# Patient Record
Sex: FEMALE | Race: WHITE | NOT HISPANIC OR LATINO | Employment: UNEMPLOYED | URBAN - METROPOLITAN AREA
[De-identification: names, ages, dates, MRNs, and addresses within clinical notes are randomized per-mention and may not be internally consistent; named-entity substitution may affect disease eponyms.]

---

## 2018-09-17 ENCOUNTER — NON-PROVIDER VISIT (OUTPATIENT)
Dept: URGENT CARE | Facility: PHYSICIAN GROUP | Age: 28
End: 2018-09-17

## 2018-09-17 DIAGNOSIS — Z02.1 PRE-EMPLOYMENT DRUG SCREENING: ICD-10-CM

## 2018-09-17 LAB
AMP AMPHETAMINE: NORMAL
COC COCAINE: NORMAL
INT CON NEG: NEGATIVE
INT CON POS: POSITIVE
MET METHAMPHETAMINES: NORMAL
OPI OPIATES: NORMAL
PCP PHENCYCLIDINE: NORMAL
POC DRUG COMMENT 753798-OCCUPATIONAL HEALTH: NEGATIVE
THC: NORMAL

## 2018-09-17 PROCEDURE — 80305 DRUG TEST PRSMV DIR OPT OBS: CPT | Performed by: FAMILY MEDICINE

## 2019-04-08 ENCOUNTER — OFFICE VISIT (OUTPATIENT)
Dept: URGENT CARE | Facility: CLINIC | Age: 29
End: 2019-04-08
Payer: COMMERCIAL

## 2019-04-08 ENCOUNTER — HOSPITAL ENCOUNTER (OUTPATIENT)
Facility: MEDICAL CENTER | Age: 29
End: 2019-04-08
Attending: FAMILY MEDICINE
Payer: COMMERCIAL

## 2019-04-08 VITALS
TEMPERATURE: 97.8 F | HEART RATE: 69 BPM | DIASTOLIC BLOOD PRESSURE: 78 MMHG | SYSTOLIC BLOOD PRESSURE: 122 MMHG | OXYGEN SATURATION: 99 % | RESPIRATION RATE: 14 BRPM | HEIGHT: 60 IN

## 2019-04-08 DIAGNOSIS — R11.2 NAUSEA AND VOMITING, INTRACTABILITY OF VOMITING NOT SPECIFIED, UNSPECIFIED VOMITING TYPE: ICD-10-CM

## 2019-04-08 DIAGNOSIS — Z34.90 PREGNANCY, UNSPECIFIED GESTATIONAL AGE: ICD-10-CM

## 2019-04-08 LAB
APPEARANCE UR: NORMAL
BILIRUB UR STRIP-MCNC: NEGATIVE MG/DL
COLOR UR AUTO: NORMAL
GLUCOSE UR STRIP.AUTO-MCNC: NEGATIVE MG/DL
KETONES UR STRIP.AUTO-MCNC: 80 MG/DL
LEUKOCYTE ESTERASE UR QL STRIP.AUTO: NORMAL
NITRITE UR QL STRIP.AUTO: NEGATIVE
PH UR STRIP.AUTO: 5.5 [PH] (ref 5–8)
PROT UR QL STRIP: NEGATIVE MG/DL
RBC UR QL AUTO: NEGATIVE
SP GR UR STRIP.AUTO: 1.02
UROBILINOGEN UR STRIP-MCNC: 1 MG/DL

## 2019-04-08 PROCEDURE — 99203 OFFICE O/P NEW LOW 30 MIN: CPT | Performed by: FAMILY MEDICINE

## 2019-04-08 PROCEDURE — 81002 URINALYSIS NONAUTO W/O SCOPE: CPT | Performed by: FAMILY MEDICINE

## 2019-04-08 PROCEDURE — 87086 URINE CULTURE/COLONY COUNT: CPT

## 2019-04-08 RX ORDER — SWAB
1 SWAB, NON-MEDICATED MISCELLANEOUS DAILY
Qty: 30 TAB | Refills: 3 | Status: SHIPPED | OUTPATIENT
Start: 2019-04-08

## 2019-04-08 NOTE — Clinical Note
Hi all This patient was seen 2 days ago and unfortunately urine pregnancy was not ordered.  It was done and it was positive.  Could you please result that in the chart ?  Thank You

## 2019-04-09 NOTE — PROGRESS NOTES
Subjective:      Michelle Castano is a 28 y.o. female who presents with Emesis (x 1 week can't keep anything down. thinks she might be pregant )            This is a new problem.  20-year-old female otherwise healthy presenting for intermittent episodes of emesis for the past week.  She did a pregnancy test 4 days ago which was negative.  Denies any fever chills, vaginal bleeding or spotting discharge.  She denies any urinary frequency, urgency or dysuria.  No fever or chills reported.  She denies any abdominal pain.  Currently does not feel nauseous.        ROS       Objective:     /78   Pulse 69   Temp 36.6 °C (97.8 °F)   Resp 14   Ht 1.524 m (5')   SpO2 99%      Physical Exam   Constitutional: She appears well-developed and well-nourished. No distress.   HENT:   Head: Normocephalic and atraumatic.   Right Ear: External ear normal.   Left Ear: External ear normal.   Nose: Nose normal.   Eyes: Conjunctivae are normal. No scleral icterus.   Cardiovascular: Normal rate and regular rhythm.  Exam reveals no gallop and no friction rub.    No murmur heard.  Pulmonary/Chest: Effort normal. No stridor. No respiratory distress. She has no wheezes.   Abdominal: Soft. There is no tenderness. There is no CVA tenderness.   Neurological: She is alert.   Skin: Skin is warm. No rash noted. She is not diaphoretic. No erythema. No pallor.   Psychiatric: She has a normal mood and affect.           Results for orders placed or performed in visit on 04/08/19   POCT Urinalysis   Result Value Ref Range    POC Color dark yellow Negative    POC Appearance cloudy Negative    POC Leukocyte Esterase small Negative    POC Nitrites negative Negative    POC Urobiligen 1.0 Negative (0.2) mg/dL    POC Protein negative Negative mg/dL    POC Urine PH 5.5 5.0 - 8.0    POC Blood negative Negative    POC Specific Gravity 1.020 <1.005 - >1.030    POC Ketones 80 Negative mg/dL    POC Bilirubin negative Negative mg/dL    POC Glucose negative  Negative mg/dL     Pregnancy test was positive        Assessment/Plan:     1. Pregnancy, unspecified gestational age  - REFERRAL TO OB/GYN  - prenatal vitamin with Fe/FA (NORRIS PRENATAL) 28-0.8 MG Tab; Take 1 Tab by mouth every day.  Dispense: 30 Tab; Refill: 3    2. Nausea and vomiting, intractability of vomiting not specified, unspecified vomiting type  - URINE CULTURE(NEW); Future  - POCT Urinalysis    Late entry today  Urine pregnancy was positive which was given to me on a piece of paper but unfortunately was not ordered to be documented in the chart which I just ordered.  Patient was sent home with prenatal vitamin and referral to GYN  Urine culture pending just to make sure she does not have any urinary tract infection and she does not have any urinary symptoms.  Plan per orders and instructions  Warning signs reviewed

## 2019-04-11 LAB
BACTERIA UR CULT: NORMAL
SIGNIFICANT IND 70042: NORMAL
SITE SITE: NORMAL
SOURCE SOURCE: NORMAL

## 2019-05-20 LAB
ABO GROUP BLD: NORMAL
HBV SURFACE AG SERPL QL IA: NEGATIVE
HIV 1+2 AB+HIV1 P24 AG SERPL QL IA: NORMAL
RH BLD: NORMAL
RUBV IGG SERPL IA-ACNC: NORMAL
TREPONEMA PALLIDUM IGG+IGM AB [PRESENCE] IN SERUM OR PLASMA BY IMMUNOASSAY: NORMAL

## 2019-10-31 LAB — STREP GP B DNA PCR: NEGATIVE

## 2019-11-29 ENCOUNTER — HOSPITAL ENCOUNTER (INPATIENT)
Facility: MEDICAL CENTER | Age: 29
LOS: 1 days | End: 2019-11-30
Attending: OBSTETRICS & GYNECOLOGY | Admitting: OBSTETRICS & GYNECOLOGY
Payer: COMMERCIAL

## 2019-11-29 ENCOUNTER — ANESTHESIA (OUTPATIENT)
Dept: ANESTHESIOLOGY | Facility: MEDICAL CENTER | Age: 29
End: 2019-11-29
Payer: COMMERCIAL

## 2019-11-29 ENCOUNTER — ANESTHESIA EVENT (OUTPATIENT)
Dept: ANESTHESIOLOGY | Facility: MEDICAL CENTER | Age: 29
End: 2019-11-29
Payer: COMMERCIAL

## 2019-11-29 LAB
BASOPHILS # BLD AUTO: 0.4 % (ref 0–1.8)
BASOPHILS # BLD: 0.07 K/UL (ref 0–0.12)
EOSINOPHIL # BLD AUTO: 0.02 K/UL (ref 0–0.51)
EOSINOPHIL NFR BLD: 0.1 % (ref 0–6.9)
ERYTHROCYTE [DISTWIDTH] IN BLOOD BY AUTOMATED COUNT: 47 FL (ref 35.9–50)
ERYTHROCYTE [DISTWIDTH] IN BLOOD BY AUTOMATED COUNT: 47.3 FL (ref 35.9–50)
HCT VFR BLD AUTO: 31.1 % (ref 37–47)
HCT VFR BLD AUTO: 39.2 % (ref 37–47)
HGB BLD-MCNC: 10.2 G/DL (ref 12–16)
HGB BLD-MCNC: 12.7 G/DL (ref 12–16)
HOLDING TUBE BB 8507: NORMAL
IMM GRANULOCYTES # BLD AUTO: 0.15 K/UL (ref 0–0.11)
IMM GRANULOCYTES NFR BLD AUTO: 0.9 % (ref 0–0.9)
LYMPHOCYTES # BLD AUTO: 1.73 K/UL (ref 1–4.8)
LYMPHOCYTES NFR BLD: 10.8 % (ref 22–41)
MCH RBC QN AUTO: 28.9 PG (ref 27–33)
MCH RBC QN AUTO: 29.1 PG (ref 27–33)
MCHC RBC AUTO-ENTMCNC: 32.4 G/DL (ref 33.6–35)
MCHC RBC AUTO-ENTMCNC: 32.8 G/DL (ref 33.6–35)
MCV RBC AUTO: 88.9 FL (ref 81.4–97.8)
MCV RBC AUTO: 89.3 FL (ref 81.4–97.8)
MONOCYTES # BLD AUTO: 1.12 K/UL (ref 0–0.85)
MONOCYTES NFR BLD AUTO: 7 % (ref 0–13.4)
NEUTROPHILS # BLD AUTO: 12.9 K/UL (ref 2–7.15)
NEUTROPHILS NFR BLD: 80.8 % (ref 44–72)
NRBC # BLD AUTO: 0 K/UL
NRBC BLD-RTO: 0 /100 WBC
PLATELET # BLD AUTO: 281 K/UL (ref 164–446)
PLATELET # BLD AUTO: 312 K/UL (ref 164–446)
PMV BLD AUTO: 10.2 FL (ref 9–12.9)
PMV BLD AUTO: 10.3 FL (ref 9–12.9)
RBC # BLD AUTO: 3.5 M/UL (ref 4.2–5.4)
RBC # BLD AUTO: 4.39 M/UL (ref 4.2–5.4)
WBC # BLD AUTO: 16 K/UL (ref 4.8–10.8)
WBC # BLD AUTO: 17.4 K/UL (ref 4.8–10.8)

## 2019-11-29 PROCEDURE — 770002 HCHG ROOM/CARE - OB PRIVATE (112)

## 2019-11-29 PROCEDURE — 59409 OBSTETRICAL CARE: CPT

## 2019-11-29 PROCEDURE — 85025 COMPLETE CBC W/AUTO DIFF WBC: CPT

## 2019-11-29 PROCEDURE — 700101 HCHG RX REV CODE 250

## 2019-11-29 PROCEDURE — 700102 HCHG RX REV CODE 250 W/ 637 OVERRIDE(OP): Performed by: OBSTETRICS & GYNECOLOGY

## 2019-11-29 PROCEDURE — 700111 HCHG RX REV CODE 636 W/ 250 OVERRIDE (IP)

## 2019-11-29 PROCEDURE — 700111 HCHG RX REV CODE 636 W/ 250 OVERRIDE (IP): Performed by: OBSTETRICS & GYNECOLOGY

## 2019-11-29 PROCEDURE — 3E0H7GC INTRODUCTION OF OTHER THERAPEUTIC SUBSTANCE INTO LOWER GI, VIA NATURAL OR ARTIFICIAL OPENING: ICD-10-PCS | Performed by: OBSTETRICS & GYNECOLOGY

## 2019-11-29 PROCEDURE — 85027 COMPLETE CBC AUTOMATED: CPT

## 2019-11-29 PROCEDURE — A9270 NON-COVERED ITEM OR SERVICE: HCPCS | Performed by: OBSTETRICS & GYNECOLOGY

## 2019-11-29 PROCEDURE — 36415 COLL VENOUS BLD VENIPUNCTURE: CPT

## 2019-11-29 PROCEDURE — 304965 HCHG RECOVERY SERVICES

## 2019-11-29 PROCEDURE — 700105 HCHG RX REV CODE 258: Performed by: OBSTETRICS & GYNECOLOGY

## 2019-11-29 RX ORDER — ROPIVACAINE HYDROCHLORIDE 2 MG/ML
INJECTION, SOLUTION EPIDURAL; INFILTRATION; PERINEURAL CONTINUOUS
Status: DISCONTINUED | OUTPATIENT
Start: 2019-11-29 | End: 2019-11-29

## 2019-11-29 RX ORDER — LIDOCAINE HYDROCHLORIDE 10 MG/ML
INJECTION, SOLUTION INFILTRATION; PERINEURAL
Status: COMPLETED
Start: 2019-11-29 | End: 2019-11-29

## 2019-11-29 RX ORDER — SODIUM CHLORIDE, SODIUM LACTATE, POTASSIUM CHLORIDE, AND CALCIUM CHLORIDE .6; .31; .03; .02 G/100ML; G/100ML; G/100ML; G/100ML
250 INJECTION, SOLUTION INTRAVENOUS PRN
Status: DISCONTINUED | OUTPATIENT
Start: 2019-11-29 | End: 2019-11-29 | Stop reason: HOSPADM

## 2019-11-29 RX ORDER — DOCUSATE SODIUM 100 MG/1
100 CAPSULE, LIQUID FILLED ORAL 2 TIMES DAILY PRN
Status: DISCONTINUED | OUTPATIENT
Start: 2019-11-29 | End: 2019-11-30 | Stop reason: HOSPADM

## 2019-11-29 RX ORDER — SODIUM CHLORIDE, SODIUM LACTATE, POTASSIUM CHLORIDE, AND CALCIUM CHLORIDE .6; .31; .03; .02 G/100ML; G/100ML; G/100ML; G/100ML
1000 INJECTION, SOLUTION INTRAVENOUS
Status: DISCONTINUED | OUTPATIENT
Start: 2019-11-29 | End: 2019-11-29 | Stop reason: HOSPADM

## 2019-11-29 RX ORDER — SODIUM CHLORIDE, SODIUM LACTATE, POTASSIUM CHLORIDE, CALCIUM CHLORIDE 600; 310; 30; 20 MG/100ML; MG/100ML; MG/100ML; MG/100ML
INJECTION, SOLUTION INTRAVENOUS
Status: ACTIVE
Start: 2019-11-29 | End: 2019-11-29

## 2019-11-29 RX ORDER — CITRIC ACID/SODIUM CITRATE 334-500MG
30 SOLUTION, ORAL ORAL EVERY 6 HOURS PRN
Status: DISCONTINUED | OUTPATIENT
Start: 2019-11-29 | End: 2019-11-29 | Stop reason: HOSPADM

## 2019-11-29 RX ORDER — ONDANSETRON 4 MG/1
4 TABLET, ORALLY DISINTEGRATING ORAL EVERY 6 HOURS PRN
Status: DISCONTINUED | OUTPATIENT
Start: 2019-11-29 | End: 2019-11-30 | Stop reason: HOSPADM

## 2019-11-29 RX ORDER — ALUMINA, MAGNESIA, AND SIMETHICONE 2400; 2400; 240 MG/30ML; MG/30ML; MG/30ML
30 SUSPENSION ORAL EVERY 6 HOURS PRN
Status: DISCONTINUED | OUTPATIENT
Start: 2019-11-29 | End: 2019-11-29 | Stop reason: HOSPADM

## 2019-11-29 RX ORDER — ACETAMINOPHEN 325 MG/1
325 TABLET ORAL EVERY 4 HOURS PRN
Status: DISCONTINUED | OUTPATIENT
Start: 2019-11-29 | End: 2019-11-30 | Stop reason: HOSPADM

## 2019-11-29 RX ORDER — SODIUM CHLORIDE, SODIUM LACTATE, POTASSIUM CHLORIDE, CALCIUM CHLORIDE 600; 310; 30; 20 MG/100ML; MG/100ML; MG/100ML; MG/100ML
INJECTION, SOLUTION INTRAVENOUS PRN
Status: DISCONTINUED | OUTPATIENT
Start: 2019-11-29 | End: 2019-11-30 | Stop reason: HOSPADM

## 2019-11-29 RX ORDER — MISOPROSTOL 200 UG/1
800 TABLET ORAL
Status: COMPLETED | OUTPATIENT
Start: 2019-11-29 | End: 2019-11-29

## 2019-11-29 RX ORDER — SODIUM CHLORIDE, SODIUM LACTATE, POTASSIUM CHLORIDE, CALCIUM CHLORIDE 600; 310; 30; 20 MG/100ML; MG/100ML; MG/100ML; MG/100ML
INJECTION, SOLUTION INTRAVENOUS CONTINUOUS
Status: DISCONTINUED | OUTPATIENT
Start: 2019-11-29 | End: 2019-11-29

## 2019-11-29 RX ORDER — IBUPROFEN 600 MG/1
600 TABLET ORAL EVERY 6 HOURS PRN
Status: DISCONTINUED | OUTPATIENT
Start: 2019-11-29 | End: 2019-11-30 | Stop reason: HOSPADM

## 2019-11-29 RX ORDER — OXYCODONE HYDROCHLORIDE 5 MG/1
5 TABLET ORAL EVERY 4 HOURS PRN
Status: DISCONTINUED | OUTPATIENT
Start: 2019-11-29 | End: 2019-11-30 | Stop reason: HOSPADM

## 2019-11-29 RX ORDER — ONDANSETRON 2 MG/ML
4 INJECTION INTRAMUSCULAR; INTRAVENOUS EVERY 6 HOURS PRN
Status: DISCONTINUED | OUTPATIENT
Start: 2019-11-29 | End: 2019-11-30 | Stop reason: HOSPADM

## 2019-11-29 RX ORDER — MISOPROSTOL 200 UG/1
600 TABLET ORAL
Status: DISCONTINUED | OUTPATIENT
Start: 2019-11-29 | End: 2019-11-30 | Stop reason: HOSPADM

## 2019-11-29 RX ORDER — TERBUTALINE SULFATE 1 MG/ML
0.25 INJECTION, SOLUTION SUBCUTANEOUS PRN
Status: DISCONTINUED | OUTPATIENT
Start: 2019-11-29 | End: 2019-11-29 | Stop reason: HOSPADM

## 2019-11-29 RX ADMIN — MISOPROSTOL 800 MCG: 200 TABLET ORAL at 12:41

## 2019-11-29 RX ADMIN — Medication 125 ML/HR: at 21:37

## 2019-11-29 RX ADMIN — LIDOCAINE HYDROCHLORIDE: 10 INJECTION, SOLUTION INFILTRATION; PERINEURAL at 11:45

## 2019-11-29 RX ADMIN — IBUPROFEN 600 MG: 600 TABLET ORAL at 18:59

## 2019-11-29 RX ADMIN — Medication 125 ML/HR: at 12:37

## 2019-11-29 RX ADMIN — IBUPROFEN 600 MG: 600 TABLET ORAL at 12:37

## 2019-11-29 RX ADMIN — Medication 2000 ML/HR: at 11:47

## 2019-11-29 RX ADMIN — LIDOCAINE HYDROCHLORIDE: 10 INJECTION, SOLUTION INFILTRATION; PERINEURAL at 11:00

## 2019-11-29 RX ADMIN — SODIUM CHLORIDE, POTASSIUM CHLORIDE, SODIUM LACTATE AND CALCIUM CHLORIDE: 600; 310; 30; 20 INJECTION, SOLUTION INTRAVENOUS at 11:15

## 2019-11-29 SDOH — ECONOMIC STABILITY: FOOD INSECURITY: WITHIN THE PAST 12 MONTHS, YOU WORRIED THAT YOUR FOOD WOULD RUN OUT BEFORE YOU GOT MONEY TO BUY MORE.: NEVER TRUE

## 2019-11-29 SDOH — ECONOMIC STABILITY: FOOD INSECURITY: WITHIN THE PAST 12 MONTHS, THE FOOD YOU BOUGHT JUST DIDN'T LAST AND YOU DIDN'T HAVE MONEY TO GET MORE.: NEVER TRUE

## 2019-11-29 SDOH — ECONOMIC STABILITY: TRANSPORTATION INSECURITY
IN THE PAST 12 MONTHS, HAS LACK OF TRANSPORTATION KEPT YOU FROM MEETINGS, WORK, OR FROM GETTING THINGS NEEDED FOR DAILY LIVING?: NO

## 2019-11-29 SDOH — ECONOMIC STABILITY: TRANSPORTATION INSECURITY
IN THE PAST 12 MONTHS, HAS THE LACK OF TRANSPORTATION KEPT YOU FROM MEDICAL APPOINTMENTS OR FROM GETTING MEDICATIONS?: NO

## 2019-11-29 ASSESSMENT — PATIENT HEALTH QUESTIONNAIRE - PHQ9
2. FEELING DOWN, DEPRESSED, IRRITABLE, OR HOPELESS: NOT AT ALL
SUM OF ALL RESPONSES TO PHQ9 QUESTIONS 1 AND 2: 0
1. LITTLE INTEREST OR PLEASURE IN DOING THINGS: NOT AT ALL

## 2019-11-29 ASSESSMENT — EDINBURGH POSTNATAL DEPRESSION SCALE (EPDS)
I HAVE LOOKED FORWARD WITH ENJOYMENT TO THINGS: AS MUCH AS I EVER DID
I HAVE BEEN ABLE TO LAUGH AND SEE THE FUNNY SIDE OF THINGS: AS MUCH AS I ALWAYS COULD
THINGS HAVE BEEN GETTING ON TOP OF ME: NO, MOST OF THE TIME I HAVE COPED QUITE WELL
I HAVE BLAMED MYSELF UNNECESSARILY WHEN THINGS WENT WRONG: NOT VERY OFTEN
I HAVE FELT SCARED OR PANICKY FOR NO GOOD REASON: NO, NOT AT ALL
THE THOUGHT OF HARMING MYSELF HAS OCCURRED TO ME: NEVER
I HAVE BEEN ANXIOUS OR WORRIED FOR NO GOOD REASON: NO, NOT AT ALL
I HAVE BEEN SO UNHAPPY THAT I HAVE HAD DIFFICULTY SLEEPING: NOT AT ALL
I HAVE BEEN SO UNHAPPY THAT I HAVE BEEN CRYING: NO, NEVER
I HAVE FELT SAD OR MISERABLE: NO, NOT AT ALL

## 2019-11-29 ASSESSMENT — LIFESTYLE VARIABLES: EVER_SMOKED: YES

## 2019-11-29 NOTE — H&P
Obstetrics History and Physical    Date: 19    CATALINA: 19    Patient ID: Michelle Castano is a 29 y.o.  at 40+5 by 12+3 U/S, EDC 2019    Chief Complaint:     History of Presenting Illness:   Patient reports SROM this a.m. at 0100. Also with contractions that are increasing in frequency and severity. Endorses + FM. Denies vaginal bleeding. Denies H/A, changes in vision, SOB/CP, fevers/chills, N/V, abdominal pain, dysuria, or hematuria. Sees Dr. Uribe for OB care. No complications in this pregnancy.     Review of systems: Pertinent items are noted in HPI.    Past Medical History:    No past medical history on file.    Past Surgical History:   No past surgical history on file.     Allergies:   No Known Allergies    Current Medications:  No current facility-administered medications on file prior to encounter.      Current Outpatient Medications on File Prior to Encounter   Medication Sig Dispense Refill   • prenatal vitamin with Fe/FA (NORRIS PRENATAL) 28-0.8 MG Tab Take 1 Tab by mouth every day. 30 Tab 3     Family History:   Denies FMHx of bleeding or clotting disorders  Denies FMHx of cardiovascular disease  Otherwise non-contributory    Social History:  Social History     Tobacco Use   • Smoking status: Current Every Day Smoker   • Smokeless tobacco: Never Used   Substance Use Topics   • Alcohol use: No   • Drug use: Yes     Types: Marijuana     Obstetric History:  G1:  x 1 () 8#7oz. no complications  G2: current  [facility] Care: [facility], Dr Sabiha Uribe    Gynecologic History:  LMP 2019  Menstrual cycles: regular   STDs: denies  Herpes: denies    Physical Exam:  Vitals:   Vitals:    19 1012   Temp: 36.6 °C (97.9 °F)   BP 96/50, HR 76, afebrile  General:   Alert, conversational, pleasant, no acute distress  Lungs:   No respiratory distress, lungs clear to auscultation bilaterally   Heart:   Regular rate and rhythm   Abdomen:  Soft, gravid, non-tender,  non-distended   FUNDAL HEIGHT: 41cm  Genitourinary:    SVE: 6/100/-1, grossly ruptured per RN  FETUS: EFW: 8.5 lbs by palpation     Fetal Heart Tracing:  Fetal heart variability: moderate  Fetal Heart Rate decelerations: variable  Fetal Heart Rate accelerations: yes  Baseline FHR: 130 beats per minute  Uterine contractions: regular, every 3-4 minutes    OB Ultrasound:   - Fetus: bridges  - Fetal position: cephalic per chart review  - Placenta: posterior   - Previa: no  - ELIER: n/a  - Other: anatomy normal per chart review    Labs:  Prenatal Labs reviewed:  O + / Ab neg  Rubella Immune  Hep B S Ag neg, RPR NR, Rubella Immune, HIV NR  1ht   GBS negative  Hgb 12.8, Plts 261  Declined genetic and carrier screening    Assessment:  Michelle Castano is a 29 y.o.  at 40+5 by 12+3 wk U/S, EDC 2019    1. Single Live intrauterine pregnancy at 40+5weeks  2. Active labor  3. Rupture of membranes    Plan:  - Admit to L&D for expectant management of active labor  - CLD  - IVFs  - CBC, Type and screen  - Epidural per anesthesia    Heather Mcgraw M.D.

## 2019-11-29 NOTE — L&D DELIVERY NOTE
Date: 19    PREOPERATIVE DIAGNOSIS:   1. Term intrauterine pregnancy at 40+5 weeks  2. Active labor  3. Spontaneous rupture of membranes    POSTOPERATIVE DIAGNOSIS: same as above  4. Postpartum hemorrhage  5. Uterine atony    PROCEDURE: Normal spontaneous vaginal delivery    Primary OB/GYN: Sakina Richey M.D.     Delivering Physician: Heather Mcgraw M.D.     Anesthesiologist: n/a    Anesthesia: n/a    Complications: None    Estimated blood loss: 200 ml + 675 ml post delivery  = 875 ml total      PROCEDURE DETAILS:   29 y.o.  ,now  delivered vaginally under epidural anesthesia. The infant was suctioned with the bulb. There was no nuchal cord. The infant was placed on the patient's abdomen after delivery. The cord was clamped after a 30 second delay and subsequently cut by the father of the baby. The fundus was firm with massage and IV pitocin. The placenta delivered spontaneously, intact, with normal 3-vessel cord, in valderrama presentation.     There were no cervical, vaginal, or perineal lacerations. Viable female infant weighs 3,120g (6lbs, 14oz.) Apgars were 8 and 9 at 1 and 5 minutes of life. Cord gases: Arterial pH: 7.3, BE: -9.0 Venous pH: 7.48, BE: -6.0.     Mother and infant are recovering and doing well at this time.     RN called to inform me patient had 400ml of bleeding after delivery. RN gave 1,000 mcg of rectal cytotec. I saw and  evaluated the patient and preformed a sterile bimanual exam. 275 ml of clot extracted. Uterine atony was appreciated but clamped down with removal of clots. Continue Pitocin 125m/hrl for 24 hours postpartum. Will continue to monitor.

## 2019-11-29 NOTE — CARE PLAN
Problem: Potential for postpartum infection related to presence of episiotomy/vaginal tear and/or uterine contamination  Goal: Patient will be absent from signs and symptoms of infection  Outcome: PROGRESSING AS EXPECTED  Note:   Patient shows no s/s of infection.  Will continue to monitor with hourly rounding.      Problem: Alteration in comfort related to episiotomy, vaginal repair and/or after birth pains  Goal: Patient verbalizes acceptable pain level  Outcome: PROGRESSING AS EXPECTED  Note:   Patient states pain is tolerable with pain medication.  Will continue to reassess and medicate accordingly to 0-10 pain scale.

## 2019-11-29 NOTE — PROGRESS NOTES
Patient comes in with complaints of leaking fluid starting at 0300, clear fluid and contractions starting at 0830.  Sterile speculum done, gross pooling seen.  SVE is 6/100/-1.  Dr Mcgraw called.  Patient to be admitted.  Admission done.  Patient desires epidural. Patient feeling urge to push prior to epidural.  SVE complete and patient feeling urge to push.  Dr Mcgraw called and room ready for delivery.   at 1137.      Recovery: Firm/ mod with clots/at U.  Dr Mcgraw called, ebl total after including postpartum bleeding is 980.  Cytotec given.  Bleeding improved. Per Dr Mcgraw post partum pitocin to continue to run at 125ml/hr for 24 hours.  Patient up to bathroom, voided.  Pericare done, education given.     Patient transferred to post partum.  Report given to Laverne VAZQUEZ.

## 2019-11-30 VITALS
HEIGHT: 60 IN | WEIGHT: 190 LBS | RESPIRATION RATE: 18 BRPM | DIASTOLIC BLOOD PRESSURE: 71 MMHG | BODY MASS INDEX: 37.3 KG/M2 | HEART RATE: 80 BPM | TEMPERATURE: 97.9 F | SYSTOLIC BLOOD PRESSURE: 112 MMHG | OXYGEN SATURATION: 93 %

## 2019-11-30 PROCEDURE — A9270 NON-COVERED ITEM OR SERVICE: HCPCS | Performed by: OBSTETRICS & GYNECOLOGY

## 2019-11-30 PROCEDURE — 700102 HCHG RX REV CODE 250 W/ 637 OVERRIDE(OP): Performed by: OBSTETRICS & GYNECOLOGY

## 2019-11-30 PROCEDURE — 700111 HCHG RX REV CODE 636 W/ 250 OVERRIDE (IP): Performed by: OBSTETRICS & GYNECOLOGY

## 2019-11-30 PROCEDURE — 700112 HCHG RX REV CODE 229: Performed by: OBSTETRICS & GYNECOLOGY

## 2019-11-30 RX ORDER — IBUPROFEN 600 MG/1
600 TABLET ORAL EVERY 6 HOURS PRN
Qty: 28 TAB | Refills: 0 | Status: SHIPPED | OUTPATIENT
Start: 2019-11-30 | End: 2019-12-07

## 2019-11-30 RX ADMIN — Medication 125 ML/HR: at 05:46

## 2019-11-30 RX ADMIN — DOCUSATE SODIUM 100 MG: 100 CAPSULE, LIQUID FILLED ORAL at 17:20

## 2019-11-30 RX ADMIN — ACETAMINOPHEN 325 MG: 325 TABLET, FILM COATED ORAL at 17:20

## 2019-11-30 RX ADMIN — IBUPROFEN 600 MG: 600 TABLET ORAL at 03:00

## 2019-11-30 RX ADMIN — IBUPROFEN 600 MG: 600 TABLET ORAL at 17:20

## 2019-11-30 NOTE — CARE PLAN
Problem: Communication  Goal: The ability to communicate needs accurately and effectively will improve  Outcome: MET     Problem: Safety  Goal: Will remain free from injury  Outcome: MET  Goal: Will remain free from falls  Outcome: MET     Problem: Infection  Goal: Will remain free from infection  Outcome: MET     Problem: Venous Thromboembolism (VTW)/Deep Vein Thrombosis (DVT) Prevention:  Goal: Patient will participate in Venous Thrombosis (VTE)/Deep Vein Thrombosis (DVT)Prevention Measures  Outcome: MET     Problem: Bowel/Gastric:  Goal: Normal bowel function is maintained or improved  Outcome: MET  Goal: Will not experience complications related to bowel motility  Outcome: MET     Problem: Knowledge Deficit  Goal: Knowledge of disease process/condition, treatment plan, diagnostic tests, and medications will improve  Outcome: MET  Goal: Knowledge of the prescribed therapeutic regimen will improve  Outcome: MET     Problem: Discharge Barriers/Planning  Goal: Patient's continuum of care needs will be met  Outcome: MET     Problem: Fluid Volume:  Goal: Will maintain balanced intake and output  Outcome: MET     Problem: Altered physiologic condition related to immediate post-delivery state and potential for bleeding/hemorrhage  Goal: Patient physiologically stable as evidenced by normal lochia, palpable uterine involution and vital signs within normal limits  Outcome: MET     Problem: Potential for postpartum infection related to presence of episiotomy/vaginal tear and/or uterine contamination  Goal: Patient will be absent from signs and symptoms of infection  Outcome: MET     Problem: Alteration in comfort related to episiotomy, vaginal repair and/or after birth pains  Goal: Patient is able to ambulate, care for self and infant  Outcome: MET  Goal: Patient verbalizes acceptable pain level  Outcome: MET     Problem: Potential knowledge deficit related to lack of understanding of self and  care  Goal: Patient  will verbalize understanding of self and infant care  Outcome: MET  Goal: Patient will demonstrate ability to care for self and infant  Outcome: MET     Problem: Potential anxiety related to difficulty adapting to parental role  Goal: Patient will verbalize and demonstrate effective bonding and parenting behavior  Outcome: MET     Problem: Pain Management  Goal: Pain level will decrease to patient's comfort goal  Outcome: MET

## 2019-11-30 NOTE — CONSULTS
Lactation note:  Initial visit. Mother currently has infant latched to right breast in cradle hold. She states she's been able to latch infant independently since delivery, and states she feels no pain or pinching with latch. She  her first child for 18 months, and did not have any issues or problems. Reviewed normal  feeding behaviors and normal course of breastfeeding at 12-24 hours, and what to expect. Discussed importance of offering breast with feeding cues or at least every 2-3 hours, and even if infant shows no interest, can do hand expression into infant's lips. Encouraged to continue doing skin to skin. Plan for tonight is to continue to offer breast first, if not latching well, can hand express colostrum, and refeed to infant.    Encouraged her to continue to work on deep latch, and skin 2 skin, with hand expression.     Information and phone numbers to the [facility] provided & invited to breastfeeding circles.    MOB has no other questions or concerns regarding breastfeeding. Encouraged to call for assistance as needed.

## 2019-11-30 NOTE — DISCHARGE INSTRUCTIONS
POSTPARTUM DISCHARGE INSTRUCTIONS FOR MOM    YOB: 1990   Age: 29 y.o.               Admit Date: 11/29/2019     Discharge Date: 11/30/2019  Attending Doctor:  Sabiha Uribe M.D.                  Allergies:  Patient has no known allergies.    Discharged to home by car. Discharged via wheelchair, hospital escort: Yes.  Special equipment needed: Not Applicable  Belongings with: Personal  Be sure to schedule a follow-up appointment with your primary care doctor or any specialists as instructed.     Discharge Plan:   Diet Plan: Discussed  Activity Level: Discussed  Confirmed Follow up Appointment: Appointment Scheduled  Confirmed Symptoms Management: Discussed  Medication Reconciliation Updated: No (Comments)  Influenza Vaccine Indication: Not indicated: Previously immunized this influenza season and > 8 years of age    REASONS TO CALL YOUR OBSTETRICIAN:  1.   Persistent fever or shaking chills (Temperature higher than 100.4)  2.   Heavy bleeding (soaking more than 1 pad per hour); Passing clots  3.   Foul odor from vagina  4.   Mastitis (Breast infection; breast pain, chills, fever, redness)  5.   Urinary pain, burning or frequency  6.   Severe depression longer than 24 hours    HAND WASHING  · Prior to handling the baby.  · Before breastfeeding or bottle feeding baby.  · After using the bathroom or changing the baby's diaper.    VAGINAL CARE  · Nothing inside vagina for 6 weeks: no sexual intercourse, tampons or douching.  · Bleeding may continue for 2-4 weeks.  Amount may vary.    · Call your physician for heavy bleeding which means soaking more than 1 pad per hour    BIRTH CONTROL  · It is possible to become pregnant at any time after delivery and while breastfeeding.  · Plan to discuss a method of birth control with your physician at your follow up visit. visit.    DIET AND ELIMINATION  · Eating more fiber (bran cereal, fruits, and vegetables) and drinking plenty of fluids will help to avoid  "constipation.  · Urinary frequency after childbirth is normal.    POSTPARTUM BLUES  During the first few days after birth, you may experience a sense of the \"blues\" which may include impatience, irritability or even crying.  These feeling come and go quickly.  However, as many as 1 in 10 women experience emotional symptoms known as postpartum depression.    Postpartum depression:  May start as early as the second or third day after delivery or take several weeks or months to develop.  Symptoms of \"blues\" are present, but are more intense:  Crying spells; loss of appetite; feelings of hopelessness or loss of control; fear of touching the baby; over concern or no concern at all about the baby; little or no concern about your own appearance/caring for yourself; and/or inability to sleep or excessive sleeping.  Contact your physician if you are experiencing any of these symptoms.    Crisis Hotline:  · [place] Crisis Hotline:  4-973-CQJQHNF  Or 1-296.696.1931  · [place] Crisis Hotline:  1-281.743.6349  Or 209-246-4358    PREVENTING SHAKEN BABY:  If you are angry or stressed, PUT THE BABY IN THE CRIB, step away, take some deep breaths, and wait until you are calm to care for the baby.  DO NOT SHAKE THE BABY.  You are not alone, call a supporter for help.    · Crisis Call Center 24/7 crisis line 390-770-1581 or 1-832.101.1908  · You can also text them, text \"ANSWER\" to 179393    QUIT SMOKING/TOBACCO USE:  I understand the use of any tobacco products increases my chance of suffering from future heart disease and could cause other illnesses which may shorten my life. Quitting the use of tobacco products is the single most important thing I can do to improve my health. For further information on smoking / tobacco cessation call a Toll Free Quit Line at 1-497.931.7719 (*[employer] [place]) or 1-615.514.6159 (American [employer] Association) or you can access the web based program at www.lungusa.org.    · [place] Tobacco Users " Help Line:  (704) 476-3684       Toll Free: 1-750.824.2827  · Quit Tobacco Program [facility] (865)311-8654    DEPRESSION / SUICIDE RISK:  As you are discharged from this [facility], it is important to learn how to keep safe from harming yourself.    Recognize the warning signs:  · Abrupt changes in personality, positive or negative- including increase in energy   · Giving away possessions  · Change in eating patterns- significant weight changes-  positive or negative  · Change in sleeping patterns- unable to sleep or sleeping all the time   · Unwillingness or inability to communicate  · Depression  · Unusual sadness, discouragement and loneliness  · Talk of wanting to die  · Neglect of personal appearance   · Rebelliousness- reckless behavior  · Withdrawal from people/activities they love  · Confusion- inability to concentrate     If you or a loved one observes any of these behaviors or has concerns about self-harm, here's what you can do:  · Talk about it- your feelings and reasons for harming yourself  · Remove any means that you might use to hurt yourself (examples: pills, rope, extension cords, firearm)  · Get professional help from the community (Mental Health, Substance Abuse, psychological counseling)  · Do not be alone:Call your Safe Contact- someone whom you trust who will be there for you.  · Call your local CRISIS HOTLINE 706-3307 or 823-165-0929  · Call your local Children's Mobile Crisis Response Team [place] (247) 022-2193 or www.ShopSpot  · Call the toll free National Suicide Prevention Hotlines   · National Suicide Prevention Lifeline 484-284-UYCM (6178)  · National Hope Line Network 800-SUICIDE (952-0624)    DISCHARGE SURVEY:  Thank you for choosing [facility].  We hope we provided you with very good care.  You may be receiving a survey in the mail.  Please fill it out.  Your opinion is valuable to us.    ADDITIONAL EDUCATIONAL MATERIALS GIVEN TO  PATIENT:        My signature on this form indicates that:  1.  I have reviewed and understand the above information  2.  My questions regarding this information have been answered to my satisfaction.  3.  I have formulated a plan with my discharge nurse to obtain my prescribed medication for home.

## 2019-11-30 NOTE — PROGRESS NOTES
Pt arrived via wheelchair with belongings to room S340. Report received from Heather Mancini RN. Pt oriented to room, call light & infant security. Assessment done. Fundus firm, lochia light. Vital signs stable. IV infusing 125 of pit in left wrist until 11/30 at 1137. Pt states pain is tolerable, see MAR. Pt aware of dangers related to sleeping with infant, reviewed plan of care with pt & encouraged to call with needs or prior to ambulating. Call light in place. Will continue to monitor.

## 2019-11-30 NOTE — PROGRESS NOTES
Mother resting in bed, infant placed skin to skin d/t rectal temp; mother bleeding moderately, but fundus is firm 2 below U; Patient verbalizes understanding of when to call nurse SS's of too much bleeding

## 2019-11-30 NOTE — DISCHARGE SUMMARY
Obstetrics Discharge Summary    Admission Date: 2019         Discharge Date: 2019    ADMISSION DIAGNOSIS:  1. Term intrauterine pregnancy  2. Active labor  3. Spontaneous rupture of membranes    Discharge Diagnosis: same as above    DETAILS OF HOSPITAL STAY  Presenting Problem/History of Present Illness: SROM    Hospital Course:  Patient is a 29 y.o. , who presented to [facility] at 40w5d with a chief complaint of SROM. She had prenatal care [facility] with Dr. Sabiha Uribe. Pregnancy was complicated by tobacco abuse. For full details of the delivery please refer to the delivery note dictation. Briefly, the patient underwent an uncomplicated normal spontaneous vaginal delivery. There were no complications. Estimated blood loss was 875ml. The patient delivered a viable female infant weighing 3,120g with Apgars as below in delivery summary. Patient’s recovery and postpartum course were unremarkable. By postpartum day #1 patient met all appropriate milestones and was stable to be discharged to home.    APGARs:   8    9       COMPLICATIONS: none    OBJECTIVE:  Physical Exam:  Vitals:    19 0600   BP: 112/71   Pulse: 80   Resp: 18   Temp: 36.6 °C (97.9 °F)   SpO2: 93%   General: alert, pleasant, conversational  CVS: RRR  Pulm: CTABL  Abdomen: fundus firm, ND, NT  : deferred  Extremities: moves all, no edema     LABS/STUDIES:   Results from last 7 days   Lab Units 19  1106   WBC 1501 K/uL 17.4* 16.0*     Lab Results   Component Value Date/Time    WBC 17.4 (H) 2019     Recent Results (from the past 72 hour(s))   Hold Blood Bank Specimen (Not Tested)    Collection Time: 19 11:06 AM   Result Value Ref Range    Holding Tube - Bb DONE    CBC WITH DIFFERENTIAL    Collection Time: 19 11:06 AM   Result Value Ref Range    WBC 16.0 (H) 4.8 - 10.8 K/uL    RBC 4.39 4.20 - 5.40 M/uL    Hemoglobin 12.7 12.0 - 16.0 g/dL    Hematocrit  39.2 37.0 - 47.0 %    MCV 89.3 81.4 - 97.8 fL    MCH 28.9 27.0 - 33.0 pg    MCHC 32.4 (L) 33.6 - 35.0 g/dL    RDW 47.3 35.9 - 50.0 fL    Platelet Count 312 164 - 446 K/uL    MPV 10.3 9.0 - 12.9 fL    Neutrophils-Polys 80.80 (H) 44.00 - 72.00 %    Lymphocytes 10.80 (L) 22.00 - 41.00 %    Monocytes 7.00 0.00 - 13.40 %    Eosinophils 0.10 0.00 - 6.90 %    Basophils 0.40 0.00 - 1.80 %    Immature Granulocytes 0.90 0.00 - 0.90 %    Nucleated RBC 0.00 /100 WBC    Neutrophils (Absolute) 12.90 (H) 2.00 - 7.15 K/uL    Lymphs (Absolute) 1.73 1.00 - 4.80 K/uL    Monos (Absolute) 1.12 (H) 0.00 - 0.85 K/uL    Eos (Absolute) 0.02 0.00 - 0.51 K/uL    Baso (Absolute) 0.07 0.00 - 0.12 K/uL    Immature Granulocytes (abs) 0.15 (H) 0.00 - 0.11 K/uL    NRBC (Absolute) 0.00 K/uL   CBC without differential    Collection Time: 11/29/19  8:05 PM   Result Value Ref Range    WBC 17.4 (H) 4.8 - 10.8 K/uL    RBC 3.50 (L) 4.20 - 5.40 M/uL    Hemoglobin 10.2 (L) 12.0 - 16.0 g/dL    Hematocrit 31.1 (L) 37.0 - 47.0 %    MCV 88.9 81.4 - 97.8 fL    MCH 29.1 27.0 - 33.0 pg    MCHC 32.8 (L) 33.6 - 35.0 g/dL    RDW 47.0 35.9 - 50.0 fL    Platelet Count 281 164 - 446 K/uL    MPV 10.2 9.0 - 12.9 fL     DISPOSITION: Home.    DISCHARGE MEDICATIONS:   Michelle Castano   Home Medication Instructions LIO:[id number]    Printed on:11/30/19 0707   Medication Information                      ibuprofen (MOTRIN) 600 MG Tab  Take 1 Tab by mouth every 6 hours as needed (For cramping after delivery; do not give if patient is receiving ketorolac (Toradol)) for up to 7 days.             prenatal vitamin with Fe/FA (NORRIS PRENATAL) 28-0.8 MG Tab  Take 1 Tab by mouth every day.               DISCHARGE INSTRUCTIONS:  1. Pelvic rest for 6 weeks postpartum.   2. [facility] visit in [facility] weeks at [facility] [phone].  3. Return to the emergency department if experiencing increased vaginal bleeding, severe pain, temperature greater than 100.4, or any other  concerns.    DISCHARGE CONDITION: Stable.    Heather Mcgraw M.D.

## 2019-11-30 NOTE — DISCHARGE PLANNING
"Discharge Planning Assessment Post Partum     Reason for Referral: Hx of THC  Address: [address] [place] [address] [place] [address]   Phone: [phone]  Type of Living Situation: living with  and 1 son.   Mom Diagnosis: Pregnancy  Baby Diagnosis:   Primary Language: English     Name of Baby: Sonia Castano   Father of the Baby: Carlito Castano   Involved in baby’s care? Yes     Prenatal Care: Yes  Mom's PCP: None  PCP for new baby: Pediatrician list provided     Support System: MOB and FOB's family and friends  Coping/Bonding between mother & baby: Yes  Source of Feeding: Breast feeding  Supplies for Infant: prepared for infant     Mom's Insurance: [employer]  Baby Covered on Insurance:Yes  Mother Employed/School: Not currently  Other children in the home/names & ages: Bob age 4   Financial Hardship/Income: denies   Mom's Mental status: alert and oriented  Services used prior to admit: denies     CPS History: No  Psychiatric History: No  Domestic Violence History: No  Drug/ETOH History: No, baby's tox screening is negative.  Pt reported she ate 1 gummy in her early stages of pregnancy and reported that to her OB/GYN.  Pt went on to report in all her prenatal appointments she had negative tox screenings.  Pt reported before that gummy, she had not used marijuana for \"a while\" and does not report a Hx of substance abuse.         Resources Provided: pediatrician list, children and family resource list, and post partum support resource  Referrals Made: None       Clearance for Discharge:  Pt is cleared by this LSW.   "

## 2019-12-01 NOTE — PROGRESS NOTES
Infant and Mother DC instructions complete; parents verbalize and demonstrate understanding of infant temps, baths, and when to call the MD; mother and infant assessments and VSS; infant placed in car seat by parents and escorted out by staff